# Patient Record
Sex: FEMALE | Race: WHITE | Employment: STUDENT | URBAN - METROPOLITAN AREA
[De-identification: names, ages, dates, MRNs, and addresses within clinical notes are randomized per-mention and may not be internally consistent; named-entity substitution may affect disease eponyms.]

---
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
## 2024-12-18 ENCOUNTER — OFFICE VISIT (OUTPATIENT)
Dept: OTOLARYNGOLOGY | Facility: CLINIC | Age: 24
End: 2024-12-18
Payer: COMMERCIAL

## 2024-12-18 DIAGNOSIS — J01.40 SUBACUTE PANSINUSITIS: Primary | ICD-10-CM

## 2024-12-18 NOTE — PROGRESS NOTES
NEW PATIENT PROGRESS NOTE  OTOLOGY/OTOLARYNGOLOGY    REF MD:  No referring provider defined for this encounter.    PCP: Crispin Ovalle MD    CHIEF COMPLAINT:    Chief Complaint   Patient presents with    Tonsil Problem     Pt had adenoidectomy, consulting on symptoms that have restarted.   Snoring and sinus issues        HISTORY OF PRESENT ILLNESS: Shalini Caban is a 24 year old female who presents for evaluation of congestion. She has a history of adenoidectomy at a younger age and due to the congestion she is concerned she may have adenoid regrowth. Denies sinus pressure. Notes some rhinorrhea, PND. Notes hyposmia.       PAST MEDICAL HISTORY:    Past Medical History:    Enlarged adenoids       PAST SURGICAL HISTORY:    Past Surgical History:   Procedure Laterality Date    Adenoidectomy      Anesth,nose,sinus surgery         Medications Ordered Prior to Encounter[1]    Allergies: Allergies[2]    SOCIAL HISTORY:    Social History     Tobacco Use    Smoking status: Never    Smokeless tobacco: Never   Substance Use Topics    Alcohol use: No     Alcohol/week: 0.0 standard drinks of alcohol       Family History   Problem Relation Age of Onset    Diabetes Father     No Known Problems Mother     No Known Problems Brother     No Known Problems Sister     No Known Problems Brother     Diabetes Maternal Grandmother     Cancer Maternal Grandfather         prostate cancer    Arthritis Paternal Grandmother     Cancer Paternal Grandfather         prostate cancer       REVIEW OF SYSTEMS:   Per HPI    EXAMINATION:  I washed my hands with an alcohol-based hand gel prior to examination  Constitutional:   --Vitals: There were no vitals taken for this visit.  General: no apparent distress, well-developed  Respiratory: No stridor, stertor or increased work of breathing  ENT:  --Nose: no external nasal deformity, anterior rhinoscopy: Septum midline, no inferior turbinate hypertrophy, mucosa healthy, no rhinorrhea  --OC/OP: No trismus. No  masses or lesions noted over the gingiva, buccal mucosa, tongue, FOM, hard/soft palate, tonsillar pillars, posterior pharyngeal wall. Tonsils are symmetric and soft. FOM/BOT are soft.   --Neck: No palpable cervical lymphadenopathy, no thyromegaly, no masses or lesions over the bilateral submandibular or parotid glands  --Ear: (bilateral ears were examined under binocular microscopy)  Right ear microscopic exam:  Pinna: Normal, no lesions or masses.  Mastoid: Nontender on palpation.   External auditory canal: Clear, no masses or lesions.  Tympanic membrane: Intact, no lesions, normal landmarks.  Middle ear: Aerated.    Left ear microscopic exam:  Pinna: Normal, no lesions or masses.  Mastoid: Nontender on palpation.   External auditory canal: Clear, no masses or lesions.  Tympanic membrane: Intact, no lesions, normal landmarks.  Middle ear: Aerated.    Nasal endoscopy (date 12/18/2024)  Verbal consent obtained, patient was correctly identified  Topical anesthesia with aerosolized lidocaine and neosynepherine spray in the bilateral nares  Findings:   Right: No mucous throughout. Normal mucosa. Inferior turbinate is non-hypertrophic. Middle meatus is without polyps, purulence, masses. Middle turbinate is well formed and normal appearing. Sphenoethmoid recess is without polyps, purulence, masses.   Left: No mucous throughout. Normal mucosa. Inferior turbinate is non-hypertrophic. Middle meatus is without polyps, purulence, masses. Middle turbinate is well formed and normal appearing. Sphenoethmoid recess is with purulence without polyps, masses.   Septum: largely midline  Nasopharynx: No masses, bilateral eustachian tubes are patent. The bilateral fossae of Rosenmueller are clear. No significant adenoid regrowth      ASSESSMENT/PLAN:  Shalini Caban is a 24 year old female with     ICD-10-CM   1. Subacute pansinusitis  J01.40        IMPRESSION:  Subacute pansinusitis   No significant adenoid regrowth    PLAN:  -Start  augmentin 875-125mg BID for 10 days  -Nasal saline irrigations, start daily  -Nasal decongestants as needed  -Follow-up in 2-3 weeks for re-evaluation     Situation reviewed with the patient in detail.    Kevin Marcano MD  Otology/Otolaryngology  [facility]   [address] [address] [place] [id number]  Phone [phone]  Fax [phone]           [1]   Current Outpatient Medications on File Prior to Visit   Medication Sig Dispense Refill    Azelastine HCl 0.1 % Nasal Solution 2 sprays by Nasal route 2 (two) times daily. (Patient not taking: Reported on 12/18/2024) 1 Bottle 0    Benadryl/Maalox/Lidocaine 1:1:1 solution Take 5-10 mL by mouth TID AC&HS. Gargle and spit. (Patient not taking: Reported on 12/18/2024) 300 mL 0    Clindamycin-Tretinoin 1.2-0.025 % External Gel CELIA TO FACE QHS QD FOR 30 DAYS (Patient not taking: Reported on 12/18/2024)       No current facility-administered medications on file prior to visit.   [2] No Known Allergies